# Patient Record
Sex: FEMALE | Race: WHITE | NOT HISPANIC OR LATINO
[De-identification: names, ages, dates, MRNs, and addresses within clinical notes are randomized per-mention and may not be internally consistent; named-entity substitution may affect disease eponyms.]

---

## 2017-02-07 ENCOUNTER — TRANSCRIPTION ENCOUNTER (OUTPATIENT)
Age: 27
End: 2017-02-07

## 2017-02-16 ENCOUNTER — TRANSCRIPTION ENCOUNTER (OUTPATIENT)
Age: 27
End: 2017-02-16

## 2017-03-18 ENCOUNTER — TRANSCRIPTION ENCOUNTER (OUTPATIENT)
Age: 27
End: 2017-03-18

## 2022-11-22 ENCOUNTER — APPOINTMENT (OUTPATIENT)
Dept: ENDOCRINOLOGY | Facility: CLINIC | Age: 32
End: 2022-11-22

## 2022-11-22 ENCOUNTER — NON-APPOINTMENT (OUTPATIENT)
Age: 32
End: 2022-11-22

## 2022-11-22 VITALS
WEIGHT: 140 LBS | HEIGHT: 62.5 IN | OXYGEN SATURATION: 97 % | SYSTOLIC BLOOD PRESSURE: 100 MMHG | DIASTOLIC BLOOD PRESSURE: 62 MMHG | HEART RATE: 61 BPM | BODY MASS INDEX: 25.12 KG/M2

## 2022-11-22 VITALS — HEART RATE: 72 BPM | SYSTOLIC BLOOD PRESSURE: 98 MMHG | DIASTOLIC BLOOD PRESSURE: 67 MMHG

## 2022-11-22 VITALS — SYSTOLIC BLOOD PRESSURE: 105 MMHG | DIASTOLIC BLOOD PRESSURE: 70 MMHG | HEART RATE: 65 BPM

## 2022-11-22 VITALS — SYSTOLIC BLOOD PRESSURE: 100 MMHG | HEART RATE: 71 BPM | DIASTOLIC BLOOD PRESSURE: 68 MMHG

## 2022-11-22 DIAGNOSIS — Z86.59 PERSONAL HISTORY OF OTHER MENTAL AND BEHAVIORAL DISORDERS: ICD-10-CM

## 2022-11-22 DIAGNOSIS — Z86.39 PERSONAL HISTORY OF OTHER ENDOCRINE, NUTRITIONAL AND METABOLIC DISEASE: ICD-10-CM

## 2022-11-22 DIAGNOSIS — R61 GENERALIZED HYPERHIDROSIS: ICD-10-CM

## 2022-11-22 DIAGNOSIS — R55 SYNCOPE AND COLLAPSE: ICD-10-CM

## 2022-11-22 DIAGNOSIS — E16.2 HYPOGLYCEMIA, UNSPECIFIED: ICD-10-CM

## 2022-11-22 DIAGNOSIS — Z78.9 OTHER SPECIFIED HEALTH STATUS: ICD-10-CM

## 2022-11-22 PROCEDURE — 99205 OFFICE O/P NEW HI 60 MIN: CPT | Mod: 25

## 2022-11-22 NOTE — ASSESSMENT
[FreeTextEntry1] : This is a 32-year-old female with a history of bulimia nervosa, depression, see PTSD, positive RANDI, here for evaluation of possible hypoglycemia.\par Approximately 2 weeks ago she felt a piercing pain in her head and then experienced cold sensation in her fingers and toes and reports her hands and arms "went white".  She then had near syncope multiple times and went to the ED.  Serum glucose in ED was 96.  Per patient she ate candy before going to the ED.\par Blood work with PCP on 11/11/2022 showed glucose 59 mg/dL.  Reports this was 1 and half to 2 hours after breakfast.  Doubt true hypoglycemia as she has not fulfilled Whipple's triad.  Provided with glucometer.  Advised to check glucose if she experiences symptoms of hypoglycemia.\par Check cortisol.\par Check orthostatic vital signs.\par For sensation of piercing pain in head, coldness of fingers/toes/possible flushing check calcitonin, tryptase, catecholamines, metanephrines.\par For night sweats, check LDH.\par Check 25 vitamin D as she has history of vitamin insufficiencies.\par

## 2022-11-22 NOTE — CONSULT LETTER
[Dear  ___] : Dear  [unfilled], [Consult Letter:] : I had the pleasure of evaluating your patient, [unfilled]. [Please see my note below.] : Please see my note below. [Consult Closing:] : Thank you very much for allowing me to participate in the care of this patient.  If you have any questions, please do not hesitate to contact me. [Sincerely,] : Sincerely, [FreeTextEntry3] : Kate Manuel MD, FACE\par

## 2022-11-22 NOTE — REVIEW OF SYSTEMS
[Dry Skin] : dry skin [Hair Loss] : hair loss [Headaches] : headaches [All other systems negative] : All other systems negative [FreeTextEntry2] : Night sweats [FreeTextEntry9] : Swelling in legs [de-identified] : Easy bruising

## 2022-11-22 NOTE — PHYSICAL EXAM
[Alert] : alert [No Acute Distress] : no acute distress [Normal Voice/Communication] : normal voice communication [Normal Sclera/Conjunctiva] : normal sclera/conjunctiva [No Proptosis] : no proptosis [No Neck Mass] : no neck mass was observed [No LAD] : no lymphadenopathy [Supple] : the neck was supple [No Respiratory Distress] : no respiratory distress [No Edema] : no peripheral edema [Normal Affect] : the affect was normal [Normal Insight/Judgement] : insight and judgment were intact [Normal Mood] : the mood was normal

## 2022-11-22 NOTE — HISTORY OF PRESENT ILLNESS
[FreeTextEntry1] : CC: Low glucose\par This is a 32-year-old female with a history of bulimia nervosa, depression, see PTSD, positive RANDI, here for evaluation of possible hypoglycemia.\par Approximately 2 weeks ago she felt a piercing pain in her head and then experienced cold sensation in her fingers and toes and reports her hands and arms "went white".  She then had near syncope multiple times and went to the ED.  CT head and chest x-ray normal.\par Serum glucose in ED was 96.  Per patient she ate candy before going to the ED.\par Blood work with PCP on 11/11/2022 showed glucose 59 mg/dL.  Reports this was 1 and half to 2 hours after breakfast.  Creatinine 1.09.  TSH normal.\par Glucose level in 2021 was 83 mg/dL.\par Reports she binge eats/overeats approximately 1 time per week.  She has not induced vomiting in 3 years.\par For approximately 1 and half weeks after her ER visit she felt tremors and her body "buzzing".\par She saw a neurologist and stopped her Wellbutrin and Trileptal.\par

## 2022-11-23 LAB
25(OH)D3 SERPL-MCNC: 46.9 NG/ML
ALBUMIN SERPL ELPH-MCNC: 4.8 G/DL
ALP BLD-CCNC: 115 U/L
ALT SERPL-CCNC: 15 U/L
ANION GAP SERPL CALC-SCNC: 13 MMOL/L
AST SERPL-CCNC: 19 U/L
BILIRUB SERPL-MCNC: 0.4 MG/DL
BUN SERPL-MCNC: 11 MG/DL
CALCIUM SERPL-MCNC: 10.2 MG/DL
CHLORIDE SERPL-SCNC: 102 MMOL/L
CO2 SERPL-SCNC: 24 MMOL/L
CORTIS SERPL-MCNC: 12.4 UG/DL
CREAT SERPL-MCNC: 0.9 MG/DL
EGFR: 87 ML/MIN/1.73M2
GLUCOSE SERPL-MCNC: 75 MG/DL
HCG SERPL-MCNC: <1 MIU/ML
LDH SERPL-CCNC: 164 U/L
POTASSIUM SERPL-SCNC: 5 MMOL/L
PROT SERPL-MCNC: 6.6 G/DL
SODIUM SERPL-SCNC: 139 MMOL/L
TSH SERPL-ACNC: 1.51 UIU/ML

## 2022-12-05 LAB
CALCIT SERPL-MCNC: <1 PG/ML
CGA SERPL-MCNC: 67.1 NG/ML
DOPAMINE UR-MCNC: <30 PG/ML
EPINEPH UR-MCNC: 24 PG/ML
METANEPHRINE, PL: 26.3 PG/ML
NOREPINEPH UR-MCNC: 163 PG/ML
NORMETANEPHRINE, PL: 56.2 PG/ML
THYROGLOB AB SERPL-ACNC: <20 IU/ML
THYROPEROXIDASE AB SERPL IA-ACNC: <10 IU/ML
TRYPTASE: 5.2 UG/L

## 2022-12-21 ENCOUNTER — NON-APPOINTMENT (OUTPATIENT)
Age: 32
End: 2022-12-21

## 2024-02-21 ENCOUNTER — APPOINTMENT (OUTPATIENT)
Dept: ORTHOPEDIC SURGERY | Facility: CLINIC | Age: 34
End: 2024-02-21
Payer: MEDICAID

## 2024-02-21 VITALS — BODY MASS INDEX: 25.12 KG/M2 | HEIGHT: 62.5 IN | WEIGHT: 140 LBS

## 2024-02-21 PROCEDURE — 73562 X-RAY EXAM OF KNEE 3: CPT | Mod: LT

## 2024-02-21 PROCEDURE — 99203 OFFICE O/P NEW LOW 30 MIN: CPT

## 2024-02-21 NOTE — HISTORY OF PRESENT ILLNESS
[de-identified] : Initial Visit: Left knee pain Reason: while playing soccer Duration: 1 week Prior studies: x rays ordered Symptoms: swelling/ feels like its locking/ bending / unstable Aggravating Fx: walking / fully extending leg/  Alleviating Fx: ice / pain med Medical Hx: none Surgery Hx: Left acl reconstu- 2018  Pain Med: OTC Advil Current Med: Lexapro/ Wellbutrin   Allergies:NKa

## 2024-02-21 NOTE — ASSESSMENT
[FreeTextEntry1] : Patient has previously tried rest, activity modification, and medications (ie. anti-inflammatories such as Ibuprofen or Tylenol) without improvement.  Patient has previously had x-ray evaluation.  Given persistent symptoms, a formal request is made for an MRI.  Patient to contact office after MRI to discuss results/exam.

## 2024-03-26 ENCOUNTER — APPOINTMENT (OUTPATIENT)
Dept: ORTHOPEDIC SURGERY | Facility: CLINIC | Age: 34
End: 2024-03-26
Payer: MEDICAID

## 2024-03-26 VITALS — BODY MASS INDEX: 25.12 KG/M2 | WEIGHT: 140 LBS | HEIGHT: 62.5 IN

## 2024-03-26 PROCEDURE — 99213 OFFICE O/P EST LOW 20 MIN: CPT

## 2024-03-27 NOTE — HISTORY OF PRESENT ILLNESS
[de-identified] : Last Visit:2/21/2024 Reason: Left knee pain - MRI results Symptoms: sharp pain when walking up and down stairs Pain Level: 4/10

## 2024-03-27 NOTE — ASSESSMENT
[FreeTextEntry1] : Discussed at length with patient exam history and imaging and prior ACL reconstruction with tear of graft reviewed at length treatment options and patient at this time elects to proceed with surgery with the plan for left knee arthroscopy ACL construction via autograft hamstring and ALL reconstruction.  Discussed with patient at length symptoms and diagnosis.  Lengthy discussion with patient regarding nonoperative and operative risks and benefits as well as surgical expectations and postop protocols and expectations, including the possibility that surgery may fail to satisfactorily resolve patient's condition / symptoms.  Patient expresses understanding and patient's questions were answered.  Patient elects to proceed with surgery.

## 2024-03-27 NOTE — PHYSICAL EXAM
[de-identified] : Left knee  Constitutional:  The patient is healthy-appearing and in no apparent distress.   Gait: The patient ambulates with a normal gait and no limp.  Cardiovascular System:  The capillary refill is less than 2 seconds.   Skin:  There are no skin abnormalities.  Left Knee:   Bony Palpation:  There is no tenderness of the medial joint line.  There is no tenderness of the lateral joint line. There is no tenderness of the medial femoral chondyle. There is no tenderness of the lateral femoral chondyle. There is no tenderness of the tibial tubercle. There is no tenderness of the superior patella. There is no tenderness of the inferior patella. There is no tenderness of the medial patellar facet. There is no tenderness of the lateral patellar facet.  Soft Tissue Palpation:  There is no tenderness of the medial retinaculum. There is no tenderness of the lateral retinaculum. There is no tenderness of the quadriceps tendon. There is no tenderness of the patella tendon. There is no tenderness of the ITB. There is no tenderness of the pes anserine.  Active Range of Motion:  The range of motion at the knee actively and passively is full.   Special Tests:  There is a negative Apley. There is a negative Steinmanns.  There is a 3+ Lahman and Anterior Drawer. There is a negative Posterior Drawer.   There is no varus or valgus laxity.  Strength:  There is 5/5 hip flexion and 5/5 knee flexion and extension.    Psychiatric:  The patient demonstrates a normal mood and affect and is active and alert  [de-identified] : MRI of left knee [facility] radiology: There is a complete tear of a prior ACL reconstruction with a vertical femoral tunnel as well as patellar chondromalacia.

## 2024-04-04 RX ORDER — OXYCODONE AND ACETAMINOPHEN 5; 325 MG/1; MG/1
5-325 TABLET ORAL
Qty: 40 | Refills: 0 | Status: ACTIVE | COMMUNITY
Start: 2024-04-04 | End: 1900-01-01

## 2024-04-05 ENCOUNTER — APPOINTMENT (OUTPATIENT)
Age: 34
End: 2024-04-05
Payer: MEDICAID

## 2024-04-05 ENCOUNTER — TRANSCRIPTION ENCOUNTER (OUTPATIENT)
Age: 34
End: 2024-04-05

## 2024-04-05 PROCEDURE — 29875 ARTHRS KNEE SURG SYNVCT LMTD: CPT | Mod: LT,59

## 2024-04-05 PROCEDURE — 27427 RECONSTRUCTION KNEE: CPT | Mod: LT,59

## 2024-04-09 ENCOUNTER — APPOINTMENT (OUTPATIENT)
Dept: ORTHOPEDIC SURGERY | Facility: CLINIC | Age: 34
End: 2024-04-09
Payer: MEDICAID

## 2024-04-09 PROCEDURE — 99024 POSTOP FOLLOW-UP VISIT: CPT

## 2024-04-09 RX ORDER — OXYCODONE AND ACETAMINOPHEN 5; 325 MG/1; MG/1
5-325 TABLET ORAL
Qty: 40 | Refills: 0 | Status: ACTIVE | COMMUNITY
Start: 2024-04-09 | End: 1900-01-01

## 2024-04-09 NOTE — HISTORY OF PRESENT ILLNESS
[de-identified] : s/p LEFT knee arthroscopy with ACL reconstruction (revision hamstring Graftlink), ALL reconstruction, PF chondroplasty, limited synovectomy [de-identified] : Patient is 4 days postop and states pain well controlled.  Denies any paresthesias.  States pain controlled [de-identified] : On evaluation left knee postop brace and dressing is intact there is a moderate effusion consistent with expectation wounds are clean dry and intact there is a negative Kp range of motion 0-1 40 with pain at end flexion negative Lachman normal sensation light touch throughout the lower extremity [de-identified] : s/p LEFT knee arthroscopy with ACL reconstruction (revision hamstring Graftlink), ALL reconstruction, PF chondroplasty, limited synovectomy [de-identified] : Discussed with patient surgery and postop protocols and expectations.  Patient to follow-up in 1 week.

## 2024-04-11 ENCOUNTER — NON-APPOINTMENT (OUTPATIENT)
Age: 34
End: 2024-04-11

## 2024-04-17 ENCOUNTER — APPOINTMENT (OUTPATIENT)
Dept: ORTHOPEDIC SURGERY | Facility: CLINIC | Age: 34
End: 2024-04-17
Payer: MEDICAID

## 2024-04-17 PROCEDURE — 99024 POSTOP FOLLOW-UP VISIT: CPT

## 2024-04-17 RX ORDER — OXYCODONE AND ACETAMINOPHEN 5; 325 MG/1; MG/1
5-325 TABLET ORAL
Qty: 30 | Refills: 0 | Status: ACTIVE | COMMUNITY
Start: 2024-04-17 | End: 1900-01-01

## 2024-04-17 NOTE — HISTORY OF PRESENT ILLNESS
[de-identified] : s/p LEFT knee arthroscopy with ACL reconstruction (revision hamstring Graftlink), ALL reconstruction, PF chondroplasty, limited synovectomy [de-identified] : Patient is 12 days postop and states pain well controlled.  Denies any paresthesias.  States pain controlled.  States PT going well [de-identified] : On evaluation left knee postop brace. there is a mild effusion consistent with expectation wounds are clean dry and intact- removed and Steristripped.  There is a negative Kp range of motion 0-120 with pain at end flexion negative Lachman normal sensation light touch throughout the lower extremity [de-identified] : s/p LEFT knee arthroscopy with ACL reconstruction (revision hamstring Graftlink), ALL reconstruction, PF chondroplasty, limited synovectomy [de-identified] : Discussed with patient surgery and postop protocols and expectations.  Patient to follow-up in 2 weeks

## 2024-05-09 ENCOUNTER — APPOINTMENT (OUTPATIENT)
Dept: ORTHOPEDIC SURGERY | Facility: CLINIC | Age: 34
End: 2024-05-09
Payer: MEDICAID

## 2024-05-09 PROCEDURE — 99024 POSTOP FOLLOW-UP VISIT: CPT

## 2024-05-09 RX ORDER — NABUMETONE 500 MG/1
500 TABLET, FILM COATED ORAL
Qty: 60 | Refills: 2 | Status: ACTIVE | COMMUNITY
Start: 2024-05-09 | End: 1900-01-01

## 2024-05-09 NOTE — HISTORY OF PRESENT ILLNESS
[de-identified] : s/p LEFT knee arthroscopy with ACL reconstruction (revision hamstring Graftlink), ALL reconstruction, PF chondroplasty, limited synovectomy [de-identified] : Patient is 5 weeks postop and states pain well controlled.  Denies any paresthesias.  States pain controlled.  States PT going well [de-identified] : On evaluation the left knee wounds are well-healed there is a minimal effusion range of motion is 0 to 135 degrees there is moderate quad atrophy consistent with expectation there is a negative anterior drawer negative Lachman [de-identified] : s/p LEFT knee arthroscopy with ACL reconstruction (revision hamstring Graftlink), ALL reconstruction, PF chondroplasty, limited synovectomy [de-identified] : Discussed with patient surgery and postop protocols and expectations.  Patient is to follow-up in 6 weeks

## 2024-06-13 ENCOUNTER — APPOINTMENT (OUTPATIENT)
Dept: ORTHOPEDIC SURGERY | Facility: CLINIC | Age: 34
End: 2024-06-13
Payer: MEDICAID

## 2024-06-13 DIAGNOSIS — S83.512A SPRAIN OF ANTERIOR CRUCIATE LIGAMENT OF LEFT KNEE, INITIAL ENCOUNTER: ICD-10-CM

## 2024-06-13 PROCEDURE — 99024 POSTOP FOLLOW-UP VISIT: CPT

## 2024-06-13 RX ORDER — NABUMETONE 500 MG/1
500 TABLET, FILM COATED ORAL
Qty: 60 | Refills: 1 | Status: ACTIVE | COMMUNITY
Start: 2024-06-13 | End: 1900-01-01

## 2024-06-13 NOTE — HISTORY OF PRESENT ILLNESS
[de-identified] : s/p LEFT knee arthroscopy with ACL reconstruction (revision hamstring Graftlink), ALL reconstruction, PF chondroplasty, limited synovectomy [de-identified] : On evaluation left knee there is a minimal effusion there is moderate quad atrophy consistent with expectation range of motion is full extension to 140 degrees with pain at end flexion there is a negative anterior drawer negative Lachman [de-identified] : s/p LEFT knee arthroscopy with ACL reconstruction (revision hamstring Graftlink), ALL reconstruction, PF chondroplasty, limited synovectomy [de-identified] : Reviewed at length again with the patient postop restrictions and she understands and admits that she was going too fast in regards to activities and we do not want her even jogging or doing any type of jumping activity until cleared by me patient states she is also riding a city bike and I discussed with her is not the active riding a bike but if she happens to fall or trip that she could put herself in harm's way because she does not have all the strength to protect her self and the graft is not healed enough to be doing such activities.  Patient to follow-up in 6 weeks [de-identified] : Last Visit: 5/9/2024 - Last post op visit Reason: Left knee pain  Pain: 5/10 Symptoms: strain in the back of the knee /  sharp

## 2024-06-13 NOTE — HISTORY OF PRESENT ILLNESS
[de-identified] : s/p LEFT knee arthroscopy with ACL reconstruction (revision hamstring Graftlink), ALL reconstruction, PF chondroplasty, limited synovectomy [de-identified] : On evaluation left knee there is a minimal effusion there is moderate quad atrophy consistent with expectation range of motion is full extension to 140 degrees with pain at end flexion there is a negative anterior drawer negative Lachman [de-identified] : s/p LEFT knee arthroscopy with ACL reconstruction (revision hamstring Graftlink), ALL reconstruction, PF chondroplasty, limited synovectomy [de-identified] : Reviewed at length again with the patient postop restrictions and she understands and admits that she was going too fast in regards to activities and we do not want her even jogging or doing any type of jumping activity until cleared by me patient states she is also riding a city bike and I discussed with her is not the active riding a bike but if she happens to fall or trip that she could put herself in harm's way because she does not have all the strength to protect her self and the graft is not healed enough to be doing such activities.  Patient to follow-up in 6 weeks [de-identified] : Last Visit: 5/9/2024 - Last post op visit Reason: Left knee pain  Pain: 5/10 Symptoms: strain in the back of the knee /  sharp

## 2024-07-25 ENCOUNTER — APPOINTMENT (OUTPATIENT)
Dept: ORTHOPEDIC SURGERY | Facility: CLINIC | Age: 34
End: 2024-07-25
Payer: MEDICAID

## 2024-07-25 DIAGNOSIS — S83.512A SPRAIN OF ANTERIOR CRUCIATE LIGAMENT OF LEFT KNEE, INITIAL ENCOUNTER: ICD-10-CM

## 2024-07-25 PROCEDURE — 99213 OFFICE O/P EST LOW 20 MIN: CPT

## 2024-07-26 NOTE — HISTORY OF PRESENT ILLNESS
[de-identified] : Last Visit: June 13,2024 post op Reason: Left knee pain  Symptoms: Stiff / Aching Pain Level: 2/10 Physical therapy - currently - Improving

## 2024-07-26 NOTE — PHYSICAL EXAM
[de-identified] : Left knee  Constitutional:  The patient is healthy-appearing and in no apparent distress.   Gait: The patient ambulates with a normal gait and no limp.  Cardiovascular System:  The capillary refill is less than 2 seconds.   Skin:  There are no skin abnormalities.  Left Knee:   Bony Palpation:  There is no tenderness of the medial joint line.  There is no tenderness of the lateral joint line. There is no tenderness of the medial femoral chondyle. There is no tenderness of the lateral femoral chondyle. There is no tenderness of the tibial tubercle. There is no tenderness of the superior patella. There is no tenderness of the inferior patella. There is no tenderness of the medial patellar facet. There is no tenderness of the lateral patellar facet.  Soft Tissue Palpation:  There is no tenderness of the medial retinaculum. There is no tenderness of the lateral retinaculum. There is no tenderness of the quadriceps tendon. There is no tenderness of the patella tendon. There is no tenderness of the ITB. There is no tenderness of the pes anserine.  Active Range of Motion:  The range of motion at the knee actively and passively is full.   Special Tests:  There is a negative Apley. There is a negative Steinmanns.  There is a negative Lahman and Anterior Drawer. There is a negative Posterior Drawer.   There is no varus or valgus laxity.  Strength:  There is 5/5 hip flexion and 5/5 knee flexion and extension.    Psychiatric:  The patient demonstrates a normal mood and affect and is active and alert

## 2024-07-26 NOTE — PHYSICAL EXAM
[de-identified] : Left knee  Constitutional:  The patient is healthy-appearing and in no apparent distress.   Gait: The patient ambulates with a normal gait and no limp.  Cardiovascular System:  The capillary refill is less than 2 seconds.   Skin:  There are no skin abnormalities.  Left Knee:   Bony Palpation:  There is no tenderness of the medial joint line.  There is no tenderness of the lateral joint line. There is no tenderness of the medial femoral chondyle. There is no tenderness of the lateral femoral chondyle. There is no tenderness of the tibial tubercle. There is no tenderness of the superior patella. There is no tenderness of the inferior patella. There is no tenderness of the medial patellar facet. There is no tenderness of the lateral patellar facet.  Soft Tissue Palpation:  There is no tenderness of the medial retinaculum. There is no tenderness of the lateral retinaculum. There is no tenderness of the quadriceps tendon. There is no tenderness of the patella tendon. There is no tenderness of the ITB. There is no tenderness of the pes anserine.  Active Range of Motion:  The range of motion at the knee actively and passively is full.   Special Tests:  There is a negative Apley. There is a negative Steinmanns.  There is a negative Lahman and Anterior Drawer. There is a negative Posterior Drawer.   There is no varus or valgus laxity.  Strength:  There is 5/5 hip flexion and 5/5 knee flexion and extension.    Psychiatric:  The patient demonstrates a normal mood and affect and is active and alert

## 2024-07-26 NOTE — HISTORY OF PRESENT ILLNESS
[de-identified] : Last Visit: June 13,2024 post op Reason: Left knee pain  Symptoms: Stiff / Aching Pain Level: 2/10 Physical therapy - currently - Improving

## 2024-07-26 NOTE — ASSESSMENT
[FreeTextEntry1] : Discussed at length with patient overall progress with activity restrictions.  Patient to follow-up in 2 months.

## 2024-09-26 ENCOUNTER — APPOINTMENT (OUTPATIENT)
Dept: ORTHOPEDIC SURGERY | Facility: CLINIC | Age: 34
End: 2024-09-26

## 2025-06-26 ENCOUNTER — APPOINTMENT (OUTPATIENT)
Dept: ORTHOPEDIC SURGERY | Facility: CLINIC | Age: 35
End: 2025-06-26
Payer: SELF-PAY

## 2025-06-26 PROCEDURE — 99214 OFFICE O/P EST MOD 30 MIN: CPT
